# Patient Record
Sex: MALE | Race: BLACK OR AFRICAN AMERICAN | NOT HISPANIC OR LATINO | Employment: UNEMPLOYED | URBAN - METROPOLITAN AREA
[De-identification: names, ages, dates, MRNs, and addresses within clinical notes are randomized per-mention and may not be internally consistent; named-entity substitution may affect disease eponyms.]

---

## 2020-07-27 ENCOUNTER — HOSPITAL ENCOUNTER (EMERGENCY)
Facility: HOSPITAL | Age: 40
Discharge: HOME | End: 2020-07-27
Attending: EMERGENCY MEDICINE | Admitting: EMERGENCY MEDICINE
Payer: COMMERCIAL

## 2020-07-27 VITALS
HEIGHT: 74 IN | WEIGHT: 242 LBS | TEMPERATURE: 98.2 F | BODY MASS INDEX: 31.06 KG/M2 | RESPIRATION RATE: 20 BRPM | HEART RATE: 101 BPM | OXYGEN SATURATION: 100 % | DIASTOLIC BLOOD PRESSURE: 88 MMHG | SYSTOLIC BLOOD PRESSURE: 152 MMHG

## 2020-07-27 DIAGNOSIS — T40.711A ACCIDENTAL CANNABIS OVERDOSE, INITIAL ENCOUNTER: Primary | ICD-10-CM

## 2020-07-27 PROCEDURE — 99284 EMERGENCY DEPT VISIT MOD MDM: CPT | Performed by: EMERGENCY MEDICINE

## 2020-07-27 PROCEDURE — 99284 EMERGENCY DEPT VISIT MOD MDM: CPT

## 2020-07-27 RX ORDER — SODIUM CHLORIDE 9 MG/ML
1000 INJECTION, SOLUTION INTRAVENOUS ONCE
Status: COMPLETED | OUTPATIENT
Start: 2020-07-27 | End: 2020-07-27

## 2020-07-27 RX ADMIN — SODIUM CHLORIDE 1000 ML/HR: 0.9 INJECTION, SOLUTION INTRAVENOUS at 00:33

## 2020-07-27 NOTE — ED PROVIDER NOTES
History  Chief Complaint   Patient presents with    Dizziness     pt presents via EMS after having a sudden onset of being dizzy and not feeling well at home after being to a party today      77-year-old male brought in by EMS secondary to dizziness and feeling like his whole body is heavy  He has not felt this before  He is here with his wife  He has no focal weakness  After extensive history taking turns out that they were at a party and he ate 1 or 2 cookies but was not aware that it was at [facility] in and he does not smoke or drink or use any drugs and has not used marijuana in the past and was unaware that these cookies had any THC in them  When I explained this to him he is relieved, he said he was feeling dizzy and everything in his body felt heavy and felt like his limbs were not necessarily attached to him and he felt like he was having some kind of out of body experience  Dizziness   Associated symptoms: no chest pain, no shortness of breath and no vomiting        None       Past Medical History:   Diagnosis Date    High cholesterol        History reviewed  No pertinent surgical history  History reviewed  No pertinent family history  I have reviewed and agree with the history as documented  E-Cigarette/Vaping    E-Cigarette Use Never User      E-Cigarette/Vaping Substances     Social History     Tobacco Use    Smoking status: Never Smoker    Smokeless tobacco: Never Used   Substance Use Topics    Alcohol use: Yes     Frequency: Monthly or less    Drug use: Never       Review of Systems   Constitutional: Negative for fever  HENT: Negative for sore throat  Eyes: Negative for discharge  Respiratory: Negative for shortness of breath  Cardiovascular: Negative for chest pain  Gastrointestinal: Negative for vomiting  Endocrine: Negative for polydipsia and polyuria  Genitourinary: Negative for dysuria and frequency  Musculoskeletal: Negative for myalgias     Skin: Negative for rash  Neurological: Positive for dizziness  Negative for seizures  Hematological: Negative for adenopathy  Psychiatric/Behavioral: Negative for hallucinations  Physical Exam  Physical Exam   Constitutional: He is oriented to person, place, and time  He appears well-developed and well-nourished  HENT:   Head: Normocephalic and atraumatic  Eyes: Pupils are equal, round, and reactive to light  Conjunctivae and EOM are normal    Sclera is injected   Neck: Normal range of motion  Neck supple  Cardiovascular: Normal rate, regular rhythm and normal heart sounds  Exam reveals no gallop and no friction rub  No murmur heard  Pulmonary/Chest: Effort normal and breath sounds normal  No respiratory distress  Abdominal: Soft  Bowel sounds are normal  He exhibits no distension  There is no tenderness  Musculoskeletal: Normal range of motion  He exhibits no edema or tenderness  Neurological: He is alert and oriented to person, place, and time  No cranial nerve deficit or sensory deficit  He exhibits normal muscle tone  Coordination normal    Skin: Skin is warm and dry  Capillary refill takes less than 2 seconds  Psychiatric: He has a normal mood and affect  Judgment normal    Nursing note and vitals reviewed        Vital Signs  ED Triage Vitals   Temperature Pulse Respirations Blood Pressure SpO2   07/27/20 0002 07/27/20 0002 07/27/20 0002 07/27/20 0002 07/27/20 0002   98 2 °F (36 8 °C) 101 20 152/88 100 %      Temp Source Heart Rate Source Patient Position - Orthostatic VS BP Location FiO2 (%)   07/27/20 0002 07/27/20 0002 07/27/20 0002 07/27/20 0002 --   Tympanic Monitor Lying Right leg       Pain Score       07/27/20 0056       No Pain           Vitals:    07/27/20 0002   BP: 152/88   Pulse: 101   Patient Position - Orthostatic VS: Lying         Visual Acuity      ED Medications  Medications   sodium chloride 0 9 % infusion (0 mL/hr Intravenous Stopped 7/27/20 0054)       Diagnostic Studies  Results Reviewed     None                 No orders to display              Procedures  Procedures         ED Course       US AUDIT      Most Recent Value   Initial Alcohol Screen: US AUDIT-C    1  How often do you have a drink containing alcohol? 1 Filed at: 07/27/2020 0003   2  How many drinks containing alcohol do you have on a typical day you are drinking? 0 Filed at: 07/27/2020 0003   3a  Male UNDER 65: How often do you have five or more drinks on one occasion? 0 Filed at: 07/27/2020 0003   3b  FEMALE Any Age, or MALE 65+: How often do you have 4 or more drinks on one occassion? 0 Filed at: 07/27/2020 0003   Audit-C Score  1 Filed at: 07/27/2020 0003                  ULYSSES/DAST-10      Most Recent Value   How many times in the past year have you    Used an illegal drug or used a prescription medication for non-medical reasons? Never Filed at: 07/27/2020 0004                                MDM  Number of Diagnoses or Management Options  Risk of Complications, Morbidity, and/or Mortality  Presenting problems: moderate  Diagnostic procedures: low  Management options: low  General comments: After extensive history taking it turns out he accidentally ate an edible cookie that had THC in it, his symptoms are consistent with marijuana use, we watched him for some time and give him IV fluids, he feels better  Stable for discharge home with his wife  Told him to sleep it off he will feel better when he wakes up      Patient Progress  Patient progress: stable        Disposition  Final diagnoses:   Accidental cannabis overdose, initial encounter     Time reflects when diagnosis was documented in both MDM as applicable and the Disposition within this note     Time User Action Codes Description Comment    7/27/2020  1:34 AM Nolia Mention Add [T40 [id number]] Accidental cannabis overdose, initial encounter       ED Disposition     ED Disposition Condition Date/Time Comment    Discharge Stable Mon Jul 27, 2020 1:33 AM Rios Yu discharge to home/self care  Follow-up Information     Follow up With Specialties Details Why Contact Info    your doctor  Schedule an appointment as soon as possible for a visit  As needed           Patient's Medications    No medications on file     No discharge procedures on file      PDMP Review     None          ED Provider  Electronically Signed by           Heriberto Dubois MD  07/27/20 9046

## 2024-02-13 ENCOUNTER — HOSPITAL ENCOUNTER (EMERGENCY)
Facility: HOSPITAL | Age: 44
Discharge: HOME | End: 2024-02-13
Attending: EMERGENCY MEDICINE
Payer: OTHER MISCELLANEOUS

## 2024-02-13 ENCOUNTER — APPOINTMENT (EMERGENCY)
Dept: CT IMAGING | Facility: HOSPITAL | Age: 44
End: 2024-02-13
Payer: OTHER MISCELLANEOUS

## 2024-02-13 VITALS
TEMPERATURE: 97.4 F | WEIGHT: 249.12 LBS | HEART RATE: 73 BPM | DIASTOLIC BLOOD PRESSURE: 94 MMHG | RESPIRATION RATE: 16 BRPM | OXYGEN SATURATION: 99 % | SYSTOLIC BLOOD PRESSURE: 157 MMHG | BODY MASS INDEX: 31.99 KG/M2

## 2024-02-13 DIAGNOSIS — S06.0XAA CONCUSSION: ICD-10-CM
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
## 2024-02-13 PROCEDURE — 70450 CT HEAD/BRAIN W/O DYE: CPT

## 2024-02-13 PROCEDURE — 99284 EMERGENCY DEPT VISIT MOD MDM: CPT | Performed by: EMERGENCY MEDICINE

## 2024-02-13 PROCEDURE — 99284 EMERGENCY DEPT VISIT MOD MDM: CPT

## 2024-02-13 PROCEDURE — 72125 CT NECK SPINE W/O DYE: CPT

## 2024-02-13 RX ORDER — ACETAMINOPHEN 325 MG/1
650 TABLET ORAL ONCE
Status: COMPLETED | OUTPATIENT
Start: 2024-02-13 | End: 2024-02-13

## 2024-02-13 RX ADMIN — ACETAMINOPHEN 650 MG: 325 TABLET, FILM COATED ORAL at 13:55
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
## 2024-02-13 NOTE — DISCHARGE INSTRUCTIONS
You were seen in the ED for headache and neck pain.  You had CT scan showing no evidence of significant abnormality. You were diagnosed with a concussion, muscular strain.  Please take Tylenol or ibuprofen as needed for your pain.  For ibuprofen you may take up to 600 mg at a time, please do not exceed 3 doses daily.  Please do not take this medication with naproxen or other anti-inflammatory medications.  Please take this with food, as it can cause increased acid production in your stomach, and ensure adequate hydration.  For Tylenol please do not exceed 1000 mg at a time, and maximum 3 times daily.  You have been discharged with a referral to the comprehensive concussion clinic.  Please follow up with your primary care physician within the next 1 week for continued management of your conditions.  Please come back to the ED if you develop uncontrolled pain, loss of consciousness, focal weakness, numbness or tingling. Thank you very much for utilizing the ED this afternoon.

## 2024-02-22 ENCOUNTER — APPOINTMENT (OUTPATIENT)
Dept: URGENT CARE | Age: 44
End: 2024-02-22
Payer: OTHER MISCELLANEOUS

## 2024-02-22 PROCEDURE — 99283 EMERGENCY DEPT VISIT LOW MDM: CPT

## 2024-02-22 PROCEDURE — G0382 LEV 3 HOSP TYPE B ED VISIT: HCPCS

## 2024-02-28 ENCOUNTER — APPOINTMENT (OUTPATIENT)
Dept: URGENT CARE | Age: 44
End: 2024-02-28
Payer: OTHER MISCELLANEOUS

## 2024-02-28 PROCEDURE — 99214 OFFICE O/P EST MOD 30 MIN: CPT | Performed by: PHYSICIAN ASSISTANT

## 2024-03-13 ENCOUNTER — APPOINTMENT (OUTPATIENT)
Dept: URGENT CARE | Age: 44
End: 2024-03-13
Payer: OTHER MISCELLANEOUS

## 2024-03-13 PROCEDURE — 99213 OFFICE O/P EST LOW 20 MIN: CPT | Performed by: PHYSICIAN ASSISTANT

## 2024-03-27 ENCOUNTER — APPOINTMENT (OUTPATIENT)
Dept: URGENT CARE | Age: 44
End: 2024-03-27
Payer: OTHER MISCELLANEOUS

## 2024-03-27 PROCEDURE — 99213 OFFICE O/P EST LOW 20 MIN: CPT | Performed by: PHYSICIAN ASSISTANT

## 2024-10-02 ENCOUNTER — OFFICE VISIT (OUTPATIENT)
Dept: FAMILY MEDICINE CLINIC | Facility: CLINIC | Age: 44
End: 2024-10-02
Payer: COMMERCIAL

## 2024-10-02 VITALS
SYSTOLIC BLOOD PRESSURE: 130 MMHG | WEIGHT: 249 LBS | BODY MASS INDEX: 31.95 KG/M2 | DIASTOLIC BLOOD PRESSURE: 80 MMHG | HEIGHT: 74 IN | OXYGEN SATURATION: 99 % | HEART RATE: 75 BPM

## 2024-10-02 DIAGNOSIS — E78.2 MIXED HYPERLIPIDEMIA: ICD-10-CM

## 2024-10-02 DIAGNOSIS — J32.0 CHRONIC MAXILLARY SINUSITIS: ICD-10-CM

## 2024-10-02 DIAGNOSIS — Z12.5 PROSTATE CANCER SCREENING: ICD-10-CM

## 2024-10-02 DIAGNOSIS — R06.7 SNEEZING: Primary | ICD-10-CM

## 2024-10-02 DIAGNOSIS — J34.89 RHINORRHEA: ICD-10-CM

## 2024-10-02 DIAGNOSIS — Z23 ENCOUNTER FOR IMMUNIZATION: ICD-10-CM

## 2024-10-02 PROCEDURE — 99203 OFFICE O/P NEW LOW 30 MIN: CPT | Performed by: FAMILY MEDICINE

## 2024-10-02 NOTE — ASSESSMENT & PLAN NOTE
Noted to have sinus opacification.  Patient has symptoms of constant sneezing and postnasal drip.  Recommended ENT evaluation.

## 2024-10-02 NOTE — ASSESSMENT & PLAN NOTE
Symptoms improved when patient takes over-the-counter antihistamine.  Patient is interested in getting allergy shots.  Refer to allergist for further testing and to discuss treatment options.
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
## 2024-10-02 NOTE — ASSESSMENT & PLAN NOTE
Past history of dyslipidemia, stopped medications.  Due for metabolic labs.  Advised to follow-up 4 weeks to review labs and to discuss medication.

## 2024-10-08 ENCOUNTER — APPOINTMENT (OUTPATIENT)
Dept: LAB | Facility: HOSPITAL | Age: 44
End: 2024-10-08
Payer: COMMERCIAL

## 2024-10-08 DIAGNOSIS — Z12.5 PROSTATE CANCER SCREENING: ICD-10-CM

## 2024-10-08 LAB
ALBUMIN SERPL BCG-MCNC: 4.8 G/DL (ref 3.5–5)
ALP SERPL-CCNC: 53 U/L (ref 34–104)
ALT SERPL W P-5'-P-CCNC: 32 U/L (ref 7–52)
ANION GAP SERPL CALCULATED.3IONS-SCNC: 8 MMOL/L (ref 4–13)
AST SERPL W P-5'-P-CCNC: 29 U/L (ref 13–39)
BILIRUB SERPL-MCNC: 0.49 MG/DL (ref 0.2–1)
BUN SERPL-MCNC: 17 MG/DL (ref 5–25)
CALCIUM SERPL-MCNC: 10.3 MG/DL (ref 8.4–10.2)
CHLORIDE SERPL-SCNC: 100 MMOL/L (ref 96–108)
CHOLEST SERPL-MCNC: 297 MG/DL
CO2 SERPL-SCNC: 30 MMOL/L (ref 21–32)
CREAT SERPL-MCNC: 1.22 MG/DL (ref 0.6–1.3)
GFR SERPL CREATININE-BSD FRML MDRD: 72 ML/MIN/1.73SQ M
GLUCOSE P FAST SERPL-MCNC: 97 MG/DL (ref 65–99)
HDLC SERPL-MCNC: 44 MG/DL
LDLC SERPL CALC-MCNC: 202 MG/DL (ref 0–100)
NONHDLC SERPL-MCNC: 253 MG/DL
POTASSIUM SERPL-SCNC: 4.1 MMOL/L (ref 3.5–5.3)
PROT SERPL-MCNC: 8.1 G/DL (ref 6.4–8.4)
PSA SERPL-MCNC: 0.26 NG/ML (ref 0–4)
SODIUM SERPL-SCNC: 138 MMOL/L (ref 135–147)
TRIGL SERPL-MCNC: 253 MG/DL

## 2024-10-08 PROCEDURE — 80053 COMPREHEN METABOLIC PANEL: CPT | Performed by: FAMILY MEDICINE

## 2024-10-08 PROCEDURE — G0103 PSA SCREENING: HCPCS

## 2024-10-08 PROCEDURE — 36415 COLL VENOUS BLD VENIPUNCTURE: CPT

## 2024-10-08 PROCEDURE — 80061 LIPID PANEL: CPT | Performed by: FAMILY MEDICINE

## 2024-10-30 ENCOUNTER — RA CDI HCC (OUTPATIENT)
Dept: OTHER | Facility: HOSPITAL | Age: 44
End: 2024-10-30

## 2024-10-30 ENCOUNTER — HOSPITAL ENCOUNTER (OUTPATIENT)
Dept: CT IMAGING | Facility: HOSPITAL | Age: 44
Discharge: HOME | End: 2024-10-30
Attending: OTOLARYNGOLOGY
Payer: COMMERCIAL

## 2024-10-30 DIAGNOSIS — J34.89 RHINORRHEA: ICD-10-CM

## 2024-10-30 DIAGNOSIS — J32.0 CHRONIC MAXILLARY SINUSITIS: ICD-10-CM

## 2024-10-30 DIAGNOSIS — R06.7 SNEEZING: ICD-10-CM

## 2024-10-30 PROCEDURE — 70486 CT MAXILLOFACIAL W/O DYE: CPT

## 2024-10-30 NOTE — PROGRESS NOTES
HCC coding opportunities       Chart reviewed, no opportunity found: CHART REVIEWED, NO OPPORTUNITY FOUND        Patients Insurance        Commercial Insurance: [employer]

## 2024-11-06 ENCOUNTER — OFFICE VISIT (OUTPATIENT)
Dept: FAMILY MEDICINE CLINIC | Facility: CLINIC | Age: 44
End: 2024-11-06
Payer: COMMERCIAL

## 2024-11-06 VITALS
DIASTOLIC BLOOD PRESSURE: 88 MMHG | OXYGEN SATURATION: 98 % | WEIGHT: 250 LBS | SYSTOLIC BLOOD PRESSURE: 132 MMHG | HEART RATE: 104 BPM | BODY MASS INDEX: 32.08 KG/M2 | HEIGHT: 74 IN

## 2024-11-06 DIAGNOSIS — R73.9 ELEVATED BLOOD SUGAR: ICD-10-CM

## 2024-11-06 DIAGNOSIS — J32.0 CHRONIC MAXILLARY SINUSITIS: ICD-10-CM

## 2024-11-06 DIAGNOSIS — E78.2 MIXED HYPERLIPIDEMIA: Primary | ICD-10-CM

## 2024-11-06 PROCEDURE — 99214 OFFICE O/P EST MOD 30 MIN: CPT | Performed by: FAMILY MEDICINE

## 2024-11-06 RX ORDER — ROSUVASTATIN CALCIUM 5 MG/1
5 TABLET, COATED ORAL DAILY
Qty: 90 TABLET | Refills: 1 | Status: SHIPPED | OUTPATIENT
Start: 2024-11-06

## 2024-11-06 NOTE — ASSESSMENT & PLAN NOTE
Patient has been advised low-carb diet.  Continue monitoring with metabolic labs, A1c to rule out diabetes.

## 2024-11-06 NOTE — ASSESSMENT & PLAN NOTE
Patient requesting to results of the CAT scan which were reviewed by the patient on MyChart.   Advised that he was noted to have right maxillary sinusitis, recommended that he follows up with ENT to discuss this further.

## 2024-11-06 NOTE — PROGRESS NOTES
"Subjective:      Patient ID: Vik Oneal is a 44 y.o. male.    HPI    Patient is here to review results of the recent labs.  Has history of dyslipidemia, used to take medication in the past but discontinued.  Metabolic labs reveal elevated total cholesterol, triglycerides and LDL.  Patient and has started diet monitor patient's.  Also noted to have borderline fasting blood sugar does not have a history of prediabetes or diabetes.      Past Medical History:   Diagnosis Date    High cholesterol        Family History   Problem Relation Age of Onset    Hyperlipidemia Mother     Hypertension Mother        History reviewed. No pertinent surgical history.     reports that he has never smoked. He has never used smokeless tobacco. He reports current alcohol use. He reports that he does not use drugs.      Current Outpatient Medications:     rosuvastatin (CRESTOR) 5 mg tablet, Take 1 tablet (5 mg total) by mouth daily, Disp: 90 tablet, Rfl: 1    The following portions of the patient's history were reviewed and updated as appropriate: allergies, current medications, past family history, past medical history, past social history, past surgical history and problem list.    Review of Systems   Respiratory: Negative.     Cardiovascular: Negative.            Objective:    /88   Pulse 104   Ht 6' 2\" (1.88 m)   Wt 113 kg (250 lb)   SpO2 98%   BMI 32.10 kg/m²      Physical Exam  Constitutional:       Appearance: Normal appearance.   Cardiovascular:      Heart sounds: Normal heart sounds.   Pulmonary:      Breath sounds: Normal breath sounds.           Recent Results (from the past 1008 hour(s))   Comprehensive metabolic panel    Collection Time: 10/08/24 11:39 AM   Result Value Ref Range    Sodium 138 135 - 147 mmol/L    Potassium 4.1 3.5 - 5.3 mmol/L    Chloride 100 96 - 108 mmol/L    CO2 30 21 - 32 mmol/L    ANION GAP 8 4 - 13 mmol/L    BUN 17 5 - 25 mg/dL    Creatinine 1.22 0.60 - 1.30 mg/dL    Glucose, Fasting 97 65 " - 99 mg/dL    Calcium 10.3 (H) 8.4 - 10.2 mg/dL    AST 29 13 - 39 U/L    ALT 32 7 - 52 U/L    Alkaline Phosphatase 53 34 - 104 U/L    Total Protein 8.1 6.4 - 8.4 g/dL    Albumin 4.8 3.5 - 5.0 g/dL    Total Bilirubin 0.49 0.20 - 1.00 mg/dL    eGFR 72 ml/min/1.73sq m   Lipid panel    Collection Time: 10/08/24 11:39 AM   Result Value Ref Range    Cholesterol 297 (H) See Comment mg/dL    Triglycerides 253 (H) See Comment mg/dL    HDL, Direct 44 >=40 mg/dL    LDL Calculated 202 (H) 0 - 100 mg/dL    Non-HDL-Chol (CHOL-HDL) 253 mg/dl   PSA, Total Screen    Collection Time: 10/08/24 11:39 AM   Result Value Ref Range    PSA 0.263 0.000 - 4.000 ng/mL       Assessment/Plan:  1. Mixed hyperlipidemia  Assessment & Plan:  Patient noted to have abnormal lipid panel with elevated LDL, fasting blood sugar is borderline high 97.  Patient does not have a history of prediabetes.  Patient used to take medication in the past but discontinued.  Patient restarted on Crestor 5 mg.  Encouraged to start with low-fat diet, healthy lifestyle.  Continue monitoring with metabolic labs.  Orders:  -     Lipid panel  -     rosuvastatin (CRESTOR) 5 mg tablet; Take 1 tablet (5 mg total) by mouth daily  2. Elevated blood sugar  Assessment & Plan:  Patient has been advised low-carb diet.  Continue monitoring with metabolic labs, A1c to rule out diabetes.  Orders:  -     Comprehensive metabolic panel  -     Hemoglobin A1C  3. Encounter for immunization  4. Chronic maxillary sinusitis  Assessment & Plan:  Patient requesting to results of the CAT scan which were reviewed by the patient on MyChart.   Advised that he was noted to have right maxillary sinusitis, recommended that he follows up with ENT to discuss this further.

## 2024-11-06 NOTE — ASSESSMENT & PLAN NOTE
Patient noted to have abnormal lipid panel with elevated LDL, fasting blood sugar is borderline high 97.  Patient does not have a history of prediabetes.  Patient used to take medication in the past but discontinued.  Patient restarted on Crestor 5 mg.  Encouraged to start with low-fat diet, healthy lifestyle.  Continue monitoring with metabolic labs.

## 2025-01-16 ENCOUNTER — RA CDI HCC (OUTPATIENT)
Dept: OTHER | Facility: HOSPITAL | Age: 45
End: 2025-01-16

## 2025-01-16 NOTE — PROGRESS NOTES
HCC coding opportunities       Chart reviewed, no opportunity found: CHART REVIEWED, NO OPPORTUNITY FOUND        Patients Insurance        Commercial Insurance: [employer]

## 2025-02-12 ENCOUNTER — OFFICE VISIT (OUTPATIENT)
Dept: FAMILY MEDICINE CLINIC | Facility: CLINIC | Age: 45
End: 2025-02-12
Payer: COMMERCIAL

## 2025-02-12 VITALS
BODY MASS INDEX: 31.44 KG/M2 | HEIGHT: 74 IN | HEART RATE: 85 BPM | SYSTOLIC BLOOD PRESSURE: 128 MMHG | OXYGEN SATURATION: 98 % | DIASTOLIC BLOOD PRESSURE: 80 MMHG | WEIGHT: 245 LBS

## 2025-02-12 DIAGNOSIS — R73.9 ELEVATED BLOOD SUGAR: ICD-10-CM

## 2025-02-12 DIAGNOSIS — M25.672 DECREASED RANGE OF MOTION OF LEFT ANKLE: ICD-10-CM

## 2025-02-12 DIAGNOSIS — Z13.0 SCREENING FOR DEFICIENCY ANEMIA: ICD-10-CM

## 2025-02-12 DIAGNOSIS — J34.89 RHINORRHEA: ICD-10-CM

## 2025-02-12 DIAGNOSIS — E78.2 MIXED HYPERLIPIDEMIA: Primary | ICD-10-CM

## 2025-02-12 DIAGNOSIS — M25.572 ACUTE LEFT ANKLE PAIN: ICD-10-CM

## 2025-02-12 DIAGNOSIS — J32.0 CHRONIC MAXILLARY SINUSITIS: ICD-10-CM

## 2025-02-12 PROBLEM — R06.7 SNEEZING: Status: RESOLVED | Noted: 2024-10-02 | Resolved: 2025-02-12

## 2025-02-12 PROBLEM — E78.00 HIGH BLOOD CHOLESTEROL: Status: ACTIVE | Noted: 2018-09-26

## 2025-02-12 PROCEDURE — 99214 OFFICE O/P EST MOD 30 MIN: CPT | Performed by: FAMILY MEDICINE

## 2025-02-12 PROCEDURE — 99396 PREV VISIT EST AGE 40-64: CPT | Performed by: FAMILY MEDICINE

## 2025-02-12 RX ORDER — MELOXICAM 15 MG/1
15 TABLET ORAL DAILY
Qty: 15 TABLET | Refills: 0 | Status: SHIPPED | OUTPATIENT
Start: 2025-02-12

## 2025-02-12 NOTE — PROGRESS NOTES
New Patient Outpatient Note    HPI:     Vik Oneal, 44 y.o. male  presents today as a new patient and to establish care.  Previously a patient of Dr. Perez.  Patient does have a significant history of hypercholesterolemia and is currently on Crestor 5 mg daily.  He has not obtained any recent labs, previous lab was in October 2024.  He did have elevations in total cholesterol, triglycerides, and LDL.  He has tolerated the Crestor up until now, and will need to follow-up with testing since he has been on the medication now for about 3 months.    Additionally the patient has chronic sinusitis.  He has followed up with ENT and they have diagnosed him with a deviated septum causing nasal congestion, runny nose, and chronic sinus pressure.  They did recommend that he go through with the septum surgery, unfortunately he does not have a date yet.  Once he does he will let us know so that we can schedule if needed for a surgical clearance.    Family Hx  UTD in chart    Past Medical History:   Diagnosis Date    Allergic     Seasonal    GERD (gastroesophageal reflux disease) 2022    High cholesterol     Hypertension 2022    Sneezing 10/02/2024        Past Surgical History:   Procedure Laterality Date    NO PAST SURGERIES            Current Outpatient Medications:     meloxicam (Mobic) 15 mg tablet, Take 1 tablet (15 mg total) by mouth daily, Disp: 15 tablet, Rfl: 0    rosuvastatin (CRESTOR) 5 mg tablet, Take 1 tablet (5 mg total) by mouth daily, Disp: 90 tablet, Rfl: 1     SOCIAL:   Rent/Own:  Renting  Currently living with: wife  Stable food: Yes  Safe At Home: Yes  Hobbies:  mikayla, TV  Profession/ employment: [employer]  Restriction to medical procedures: None    SEXUAL HISTORY:   Preference:  Women  Sexually Active:  Yes  Birth Control:  None    Psychological History  Psychiatric history: None  History of inpatient:  None  Current Therapy/ Provider:  None  Current Medications:  None    Substance History  Smoking:  None  Alcohol Use: 2 drinks per week if that  Substance use:  None      ROS:   Review of Systems   Constitutional:  Negative for chills, fever and unexpected weight change.   HENT:  Positive for congestion, postnasal drip, rhinorrhea, sinus pressure and sinus pain. Negative for ear discharge, ear pain, hearing loss and sore throat.    Eyes:  Positive for visual disturbance (wears glasses).   Respiratory:  Negative for cough, chest tightness and shortness of breath.    Cardiovascular:  Negative for chest pain and palpitations.   Gastrointestinal:  Negative for abdominal pain, blood in stool, constipation, diarrhea, nausea and vomiting.   Genitourinary:  Negative for dysuria and frequency.   Musculoskeletal:  Positive for arthralgias.   Neurological:  Negative for dizziness, light-headedness and headaches.   Psychiatric/Behavioral:  Negative for self-injury and suicidal ideas.           OBJECTIVE  Vitals:    02/12/25 0833   BP: 128/80   Pulse: 85   SpO2: 98%        Physical Exam  Constitutional:       General: He is not in acute distress.     Appearance: Normal appearance. He is obese. He is not ill-appearing, toxic-appearing or diaphoretic.   HENT:      Head: Normocephalic and atraumatic.      Right Ear: Tympanic membrane, ear canal and external ear normal.      Ears:      Comments: Increased wax on left side, no significant build up, TM normal.      Nose: Congestion and rhinorrhea present.      Mouth/Throat:      Mouth: Mucous membranes are moist.      Pharynx: Oropharynx is clear. No oropharyngeal exudate or posterior oropharyngeal erythema.   Eyes:      General:         Right eye: No discharge.         Left eye: No discharge.      Pupils: Pupils are equal, round, and reactive to light.   Cardiovascular:      Rate and Rhythm: Normal rate and regular rhythm.      Heart sounds: Normal heart sounds. No murmur heard.     No friction rub. No gallop.   Pulmonary:      Effort: Pulmonary effort is normal. No respiratory  distress.      Breath sounds: Normal breath sounds. No stridor. No wheezing, rhonchi or rales.   Abdominal:      General: Bowel sounds are normal. There is no distension.      Palpations: Abdomen is soft.      Tenderness: There is no abdominal tenderness.   Musculoskeletal:      Cervical back: Neck supple. No tenderness.      Comments: Tenderness to palpation of the medial aspect of the left ankle.  Tenderness with palpation/range of motion.  Mild decrease in range of motion in plantarflexion and dorsiflexion.  Significant swelling.  Pulses normal.   Lymphadenopathy:      Cervical: No cervical adenopathy.   Skin:     General: Skin is warm.      Capillary Refill: Capillary refill takes less than 2 seconds.   Neurological:      Mental Status: He is alert.      Sensory: No sensory deficit (Light touch present all 4 extremities).      Motor: No weakness (/5 in all 4 extremities).            ASSESSMENT AND PLAN   Vik was seen today for physical exam.  Diagnoses and all orders for this visit:    Mixed hyperlipidemia  History of significant increases to cholesterol.  Patient's total cholesterol is around 300, triglycerides 250, and .  He is currently on Crestor 5 mg.  He has not had any reevaluation once started on the medication, but I would guess that he likely needs to go up on the dose.  Therefore will obtain labs and follow-up dosing appropriately.  -     Lipid panel; Future    Chronic maxillary sinusitis  Rhinorrhea  Patient has a history of maxillary sinusitis/rhinorrhea.  He will be following up with ENT and eventually having a septal surgery.  Once he obtains the date, he is to call the office or stopping to review so that we could get him on the schedule for a preop clearance if necessary.    Elevated blood sugar  History of elevated fasting blood sugar.  He has not had any A1c in the past.  Recommend that he obtain labs for follow-up to determine diabetic/prediabetic status.  -     Hemoglobin A1C;  Future    Screening for deficiency anemia  Patient has not had recent labs reviewing CBC.  Will obtain labs to evaluate for any underlying anemia  -     CBC and differential; Future    Acute left ankle pain  Decreased range of motion of left ankle  Towards the end of the patient's visit, he mentioned that he was having increased left ankle pain and decreased range of motion.  Will start treatment with anti-inflammatory and further evaluation with x-ray.  -     XR ankle 3+ vw left; Future  -     meloxicam (Mobic) 15 mg tablet; Take 1 tablet (15 mg total) by mouth daily         DO Oscar Brooks [facility]  2/14/2025 7:19 AM

## 2025-02-26 DIAGNOSIS — M25.672 DECREASED RANGE OF MOTION OF LEFT ANKLE: ICD-10-CM

## 2025-02-26 DIAGNOSIS — M25.572 ACUTE LEFT ANKLE PAIN: ICD-10-CM

## 2025-02-26 RX ORDER — MELOXICAM 15 MG/1
15 TABLET ORAL DAILY
Qty: 15 TABLET | Refills: 0 | OUTPATIENT
Start: 2025-02-26

## 2025-05-02 ENCOUNTER — TELEPHONE (OUTPATIENT)
Age: 45
End: 2025-05-02

## 2025-05-02 NOTE — TELEPHONE ENCOUNTER
Faxed lab scripts per PT's wife request to [employer] [facility] [address] [place] [phone]

## 2025-06-13 PROCEDURE — 88305 TISSUE EXAM BY PATHOLOGIST: CPT | Performed by: PATHOLOGY

## 2025-06-16 ENCOUNTER — LAB REQUISITION (OUTPATIENT)
Dept: LAB | Facility: HOSPITAL | Age: 45
End: 2025-06-16
Payer: COMMERCIAL

## 2025-06-16 DIAGNOSIS — J32.0 CHRONIC MAXILLARY SINUSITIS: ICD-10-CM

## 2025-06-23 PROCEDURE — 88305 TISSUE EXAM BY PATHOLOGIST: CPT | Performed by: PATHOLOGY

## 2025-07-18 ENCOUNTER — APPOINTMENT (OUTPATIENT)
Dept: LAB | Facility: CLINIC | Age: 45
End: 2025-07-18
Payer: COMMERCIAL

## 2025-07-18 DIAGNOSIS — E78.2 MIXED HYPERLIPIDEMIA: ICD-10-CM

## 2025-07-18 DIAGNOSIS — J32.0 CHRONIC MAXILLARY SINUSITIS: ICD-10-CM

## 2025-07-18 DIAGNOSIS — Z01.818 PRE-OP TESTING: ICD-10-CM

## 2025-07-18 DIAGNOSIS — R73.9 ELEVATED BLOOD SUGAR: ICD-10-CM

## 2025-07-18 DIAGNOSIS — Z13.0 SCREENING FOR DEFICIENCY ANEMIA: ICD-10-CM

## 2025-07-18 LAB
ALBUMIN SERPL BCG-MCNC: 4.9 G/DL (ref 3.5–5)
ALP SERPL-CCNC: 63 U/L (ref 34–104)
ALT SERPL W P-5'-P-CCNC: 33 U/L (ref 7–52)
ANION GAP SERPL CALCULATED.3IONS-SCNC: 11 MMOL/L (ref 4–13)
AST SERPL W P-5'-P-CCNC: 39 U/L (ref 13–39)
BASOPHILS # BLD AUTO: 0.07 THOUSANDS/ÂΜL (ref 0–0.1)
BASOPHILS NFR BLD AUTO: 1 % (ref 0–1)
BILIRUB SERPL-MCNC: 0.66 MG/DL (ref 0.2–1)
BUN SERPL-MCNC: 17 MG/DL (ref 5–25)
CALCIUM SERPL-MCNC: 10 MG/DL (ref 8.4–10.2)
CHLORIDE SERPL-SCNC: 100 MMOL/L (ref 96–108)
CHOLEST SERPL-MCNC: 285 MG/DL (ref ?–200)
CO2 SERPL-SCNC: 29 MMOL/L (ref 21–32)
CREAT SERPL-MCNC: 1.18 MG/DL (ref 0.6–1.3)
EOSINOPHIL # BLD AUTO: 0.1 THOUSAND/ÂΜL (ref 0–0.61)
EOSINOPHIL NFR BLD AUTO: 2 % (ref 0–6)
ERYTHROCYTE [DISTWIDTH] IN BLOOD BY AUTOMATED COUNT: 12.6 % (ref 11.6–15.1)
GFR SERPL CREATININE-BSD FRML MDRD: 75 ML/MIN/1.73SQ M
GLUCOSE P FAST SERPL-MCNC: 90 MG/DL (ref 65–99)
HCT VFR BLD AUTO: 47.4 % (ref 36.5–49.3)
HDLC SERPL-MCNC: 47 MG/DL
HGB BLD-MCNC: 15.9 G/DL (ref 12–17)
IMM GRANULOCYTES # BLD AUTO: 0.02 THOUSAND/UL (ref 0–0.2)
IMM GRANULOCYTES NFR BLD AUTO: 0 % (ref 0–2)
LDLC SERPL CALC-MCNC: 207 MG/DL (ref 0–100)
LYMPHOCYTES # BLD AUTO: 3.41 THOUSANDS/ÂΜL (ref 0.6–4.47)
LYMPHOCYTES NFR BLD AUTO: 50 % (ref 14–44)
MCH RBC QN AUTO: 29 PG (ref 26.8–34.3)
MCHC RBC AUTO-ENTMCNC: 33.5 G/DL (ref 31.4–37.4)
MCV RBC AUTO: 87 FL (ref 82–98)
MONOCYTES # BLD AUTO: 0.49 THOUSAND/ÂΜL (ref 0.17–1.22)
MONOCYTES NFR BLD AUTO: 7 % (ref 4–12)
NEUTROPHILS # BLD AUTO: 2.76 THOUSANDS/ÂΜL (ref 1.85–7.62)
NEUTS SEG NFR BLD AUTO: 40 % (ref 43–75)
NONHDLC SERPL-MCNC: 238 MG/DL
NRBC BLD AUTO-RTO: 0 /100 WBCS
PLATELET # BLD AUTO: 334 THOUSANDS/UL (ref 149–390)
PMV BLD AUTO: 11 FL (ref 8.9–12.7)
POTASSIUM SERPL-SCNC: 3.8 MMOL/L (ref 3.5–5.3)
PROT SERPL-MCNC: 8.3 G/DL (ref 6.4–8.4)
RBC # BLD AUTO: 5.48 MILLION/UL (ref 3.88–5.62)
SODIUM SERPL-SCNC: 140 MMOL/L (ref 135–147)
TRIGL SERPL-MCNC: 157 MG/DL (ref ?–150)
WBC # BLD AUTO: 6.85 THOUSAND/UL (ref 4.31–10.16)

## 2025-07-18 PROCEDURE — 85025 COMPLETE CBC W/AUTO DIFF WBC: CPT

## 2025-07-19 LAB
EST. AVERAGE GLUCOSE BLD GHB EST-MCNC: 120 MG/DL
HBA1C MFR BLD: 5.8 %

## 2025-08-08 ENCOUNTER — OFFICE VISIT (OUTPATIENT)
Dept: FAMILY MEDICINE CLINIC | Facility: CLINIC | Age: 45
End: 2025-08-08
Payer: COMMERCIAL

## 2025-08-18 ENCOUNTER — TRANSCRIBE ORDERS (OUTPATIENT)
Dept: SLEEP CENTER | Facility: CLINIC | Age: 45
End: 2025-08-18

## 2025-08-18 DIAGNOSIS — G47.19 EXCESSIVE DAYTIME SLEEPINESS: ICD-10-CM

## 2025-08-18 DIAGNOSIS — R06.83 SNORING: Primary | ICD-10-CM
